# Patient Record
Sex: FEMALE | Race: BLACK OR AFRICAN AMERICAN | NOT HISPANIC OR LATINO | URBAN - METROPOLITAN AREA
[De-identification: names, ages, dates, MRNs, and addresses within clinical notes are randomized per-mention and may not be internally consistent; named-entity substitution may affect disease eponyms.]

---

## 2023-10-10 ENCOUNTER — HOSPITAL ENCOUNTER (EMERGENCY)
Facility: HOSPITAL | Age: 33
Discharge: HOME | End: 2023-10-10
Attending: EMERGENCY MEDICINE
Payer: COMMERCIAL

## 2023-10-10 ENCOUNTER — APPOINTMENT (OUTPATIENT)
Dept: RADIOLOGY | Facility: HOSPITAL | Age: 33
End: 2023-10-10
Payer: COMMERCIAL

## 2023-10-10 VITALS
BODY MASS INDEX: 29.47 KG/M2 | TEMPERATURE: 97.9 F | HEART RATE: 81 BPM | HEIGHT: 61 IN | WEIGHT: 156.09 LBS | OXYGEN SATURATION: 100 % | SYSTOLIC BLOOD PRESSURE: 132 MMHG | DIASTOLIC BLOOD PRESSURE: 71 MMHG | RESPIRATION RATE: 18 BRPM

## 2023-10-10 DIAGNOSIS — N20.0 KIDNEY STONE: Primary | ICD-10-CM

## 2023-10-10 LAB
ALBUMIN SERPL BCP-MCNC: 4.2 G/DL (ref 3.4–5)
ALP SERPL-CCNC: 61 U/L (ref 33–110)
ALT SERPL W P-5'-P-CCNC: 11 U/L (ref 7–45)
ANION GAP SERPL CALC-SCNC: 14 MMOL/L (ref 10–20)
APPEARANCE UR: ABNORMAL
AST SERPL W P-5'-P-CCNC: 17 U/L (ref 9–39)
BACTERIA #/AREA URNS AUTO: ABNORMAL /HPF
BASOPHILS # BLD AUTO: 0.03 X10*3/UL (ref 0–0.1)
BASOPHILS NFR BLD AUTO: 0.2 %
BILIRUB SERPL-MCNC: 0.4 MG/DL (ref 0–1.2)
BILIRUB UR STRIP.AUTO-MCNC: NEGATIVE MG/DL
BUN SERPL-MCNC: 13 MG/DL (ref 6–23)
CALCIUM SERPL-MCNC: 9.7 MG/DL (ref 8.6–10.6)
CHLORIDE SERPL-SCNC: 105 MMOL/L (ref 98–107)
CO2 SERPL-SCNC: 24 MMOL/L (ref 21–32)
COLOR UR: YELLOW
CREAT SERPL-MCNC: 0.62 MG/DL (ref 0.5–1.05)
EOSINOPHIL # BLD AUTO: 0.22 X10*3/UL (ref 0–0.7)
EOSINOPHIL NFR BLD AUTO: 1.5 %
ERYTHROCYTE [DISTWIDTH] IN BLOOD BY AUTOMATED COUNT: 11.9 % (ref 11.5–14.5)
GFR SERPL CREATININE-BSD FRML MDRD: >90 ML/MIN/1.73M*2
GLUCOSE SERPL-MCNC: 115 MG/DL (ref 74–99)
GLUCOSE UR STRIP.AUTO-MCNC: NEGATIVE MG/DL
HCT VFR BLD AUTO: 38.4 % (ref 36–46)
HGB BLD-MCNC: 12.7 G/DL (ref 12–16)
IMM GRANULOCYTES # BLD AUTO: 0.04 X10*3/UL (ref 0–0.7)
IMM GRANULOCYTES NFR BLD AUTO: 0.3 % (ref 0–0.9)
KETONES UR STRIP.AUTO-MCNC: NEGATIVE MG/DL
LEUKOCYTE ESTERASE UR QL STRIP.AUTO: ABNORMAL
LIPASE SERPL-CCNC: 20 U/L (ref 9–82)
LYMPHOCYTES # BLD AUTO: 1.55 X10*3/UL (ref 1.2–4.8)
LYMPHOCYTES NFR BLD AUTO: 10.6 %
MCH RBC QN AUTO: 30.5 PG (ref 26–34)
MCHC RBC AUTO-ENTMCNC: 33.1 G/DL (ref 32–36)
MCV RBC AUTO: 92 FL (ref 80–100)
MONOCYTES # BLD AUTO: 0.89 X10*3/UL (ref 0.1–1)
MONOCYTES NFR BLD AUTO: 6.1 %
MUCOUS THREADS #/AREA URNS AUTO: ABNORMAL /LPF
NEUTROPHILS # BLD AUTO: 11.83 X10*3/UL (ref 1.2–7.7)
NEUTROPHILS NFR BLD AUTO: 81.3 %
NITRITE UR QL STRIP.AUTO: NEGATIVE
NRBC BLD-RTO: 0 /100 WBCS (ref 0–0)
PH UR STRIP.AUTO: 7 [PH]
PLATELET # BLD AUTO: 142 X10*3/UL (ref 150–450)
PMV BLD AUTO: 13.1 FL (ref 7.5–11.5)
POTASSIUM SERPL-SCNC: 3.7 MMOL/L (ref 3.5–5.3)
PREGNANCY TEST URINE, POC: NEGATIVE
PROT SERPL-MCNC: 8.1 G/DL (ref 6.4–8.2)
PROT UR STRIP.AUTO-MCNC: ABNORMAL MG/DL
RBC # BLD AUTO: 4.16 X10*6/UL (ref 4–5.2)
RBC # UR STRIP.AUTO: ABNORMAL /UL
RBC #/AREA URNS AUTO: >20 /HPF
SODIUM SERPL-SCNC: 139 MMOL/L (ref 136–145)
SP GR UR STRIP.AUTO: 1.01
SQUAMOUS #/AREA URNS AUTO: ABNORMAL /HPF
UROBILINOGEN UR STRIP.AUTO-MCNC: <2 MG/DL
WBC # BLD AUTO: 14.6 X10*3/UL (ref 4.4–11.3)
WBC #/AREA URNS AUTO: >50 /HPF

## 2023-10-10 PROCEDURE — 96361 HYDRATE IV INFUSION ADD-ON: CPT

## 2023-10-10 PROCEDURE — 85025 COMPLETE CBC W/AUTO DIFF WBC: CPT

## 2023-10-10 PROCEDURE — 99284 EMERGENCY DEPT VISIT MOD MDM: CPT | Mod: 25

## 2023-10-10 PROCEDURE — 87086 URINE CULTURE/COLONY COUNT: CPT

## 2023-10-10 PROCEDURE — 96375 TX/PRO/DX INJ NEW DRUG ADDON: CPT

## 2023-10-10 PROCEDURE — 83690 ASSAY OF LIPASE: CPT

## 2023-10-10 PROCEDURE — 74176 CT ABD & PELVIS W/O CONTRAST: CPT | Mod: FOREIGN READ | Performed by: RADIOLOGY

## 2023-10-10 PROCEDURE — 99282 EMERGENCY DEPT VISIT SF MDM: CPT

## 2023-10-10 PROCEDURE — G1004 CDSM NDSC: HCPCS

## 2023-10-10 PROCEDURE — 2580000001 HC RX 258 IV SOLUTIONS

## 2023-10-10 PROCEDURE — 99285 EMERGENCY DEPT VISIT HI MDM: CPT | Performed by: EMERGENCY MEDICINE

## 2023-10-10 PROCEDURE — 96374 THER/PROPH/DIAG INJ IV PUSH: CPT

## 2023-10-10 PROCEDURE — 80053 COMPREHEN METABOLIC PANEL: CPT

## 2023-10-10 PROCEDURE — 81001 URINALYSIS AUTO W/SCOPE: CPT

## 2023-10-10 PROCEDURE — 36415 COLL VENOUS BLD VENIPUNCTURE: CPT

## 2023-10-10 PROCEDURE — 2500000004 HC RX 250 GENERAL PHARMACY W/ HCPCS (ALT 636 FOR OP/ED)

## 2023-10-10 RX ORDER — FLUCONAZOLE 150 MG/1
150 TABLET ORAL
Qty: 2 TABLET | Refills: 0 | Status: SHIPPED | OUTPATIENT
Start: 2023-10-10

## 2023-10-10 RX ORDER — CEPHALEXIN 500 MG/1
500 CAPSULE ORAL 4 TIMES DAILY
Qty: 28 CAPSULE | Refills: 0 | Status: SHIPPED | OUTPATIENT
Start: 2023-10-10 | End: 2023-10-17

## 2023-10-10 RX ORDER — ONDANSETRON 4 MG/1
4 TABLET, ORALLY DISINTEGRATING ORAL EVERY 8 HOURS PRN
Qty: 21 TABLET | Refills: 0 | Status: SHIPPED | OUTPATIENT
Start: 2023-10-10

## 2023-10-10 RX ORDER — MORPHINE SULFATE 4 MG/ML
2 INJECTION INTRAVENOUS ONCE
Status: COMPLETED | OUTPATIENT
Start: 2023-10-10 | End: 2023-10-10

## 2023-10-10 RX ORDER — OXYCODONE AND ACETAMINOPHEN 5; 325 MG/1; MG/1
1 TABLET ORAL EVERY 6 HOURS PRN
Qty: 12 TABLET | Refills: 0 | Status: SHIPPED | OUTPATIENT
Start: 2023-10-10 | End: 2023-10-13

## 2023-10-10 RX ORDER — KETOROLAC TROMETHAMINE 10 MG/1
10 TABLET, FILM COATED ORAL EVERY 6 HOURS PRN
Qty: 20 TABLET | Refills: 0 | Status: SHIPPED | OUTPATIENT
Start: 2023-10-10 | End: 2023-10-15

## 2023-10-10 RX ORDER — KETOROLAC TROMETHAMINE 15 MG/ML
15 INJECTION, SOLUTION INTRAMUSCULAR; INTRAVENOUS ONCE
Status: COMPLETED | OUTPATIENT
Start: 2023-10-10 | End: 2023-10-10

## 2023-10-10 RX ORDER — TAMSULOSIN HYDROCHLORIDE 0.4 MG/1
0.4 CAPSULE ORAL
Qty: 7 CAPSULE | Refills: 0 | Status: SHIPPED | OUTPATIENT
Start: 2023-10-10 | End: 2023-10-17

## 2023-10-10 RX ADMIN — KETOROLAC TROMETHAMINE 15 MG: 15 INJECTION, SOLUTION INTRAMUSCULAR; INTRAVENOUS at 03:55

## 2023-10-10 RX ADMIN — MORPHINE SULFATE 2 MG: 4 INJECTION INTRAVENOUS at 04:32

## 2023-10-10 RX ADMIN — SODIUM CHLORIDE, POTASSIUM CHLORIDE, SODIUM LACTATE AND CALCIUM CHLORIDE 500 ML: 600; 310; 30; 20 INJECTION, SOLUTION INTRAVENOUS at 04:30

## 2023-10-10 ASSESSMENT — COLUMBIA-SUICIDE SEVERITY RATING SCALE - C-SSRS
1. IN THE PAST MONTH, HAVE YOU WISHED YOU WERE DEAD OR WISHED YOU COULD GO TO SLEEP AND NOT WAKE UP?: NO
6. HAVE YOU EVER DONE ANYTHING, STARTED TO DO ANYTHING, OR PREPARED TO DO ANYTHING TO END YOUR LIFE?: NO
2. HAVE YOU ACTUALLY HAD ANY THOUGHTS OF KILLING YOURSELF?: NO
6. HAVE YOU EVER DONE ANYTHING, STARTED TO DO ANYTHING, OR PREPARED TO DO ANYTHING TO END YOUR LIFE?: NO
2. HAVE YOU ACTUALLY HAD ANY THOUGHTS OF KILLING YOURSELF?: NO

## 2023-10-10 NOTE — ED PROVIDER NOTES
Emergency Medicine Transition of Care Note.    I received Coy Hernandez in signout from previous team.  Please see the previous ED provider note for all HPI, PE and MDM up to the time of signout at 0700. This is in addition to the primary record.    In brief Coy Hernandez is an 33 y.o. female presenting for flank pain at the time of signout we were awaiting: Urology recommendations.      ED Course as of 10/10/23 0808   Tue Oct 10, 2023   0556 Urology consulted given obstructive stone, signs of urinary infection. Patient notified of results and signed out pending urology evaluations and final recs [SA]      ED Course User Index  [SA] Sheri Delgado,          Diagnoses as of 10/10/23 0808   Kidney stone     Labs Reviewed   CBC WITH AUTO DIFFERENTIAL - Abnormal       Result Value    WBC 14.6 (*)     nRBC 0.0      RBC 4.16      Hemoglobin 12.7      Hematocrit 38.4      MCV 92      MCH 30.5      MCHC 33.1      RDW 11.9      Platelets 142 (*)     MPV 13.1 (*)     Neutrophils % 81.3      Immature Granulocytes %, Automated 0.3      Lymphocytes % 10.6      Monocytes % 6.1      Eosinophils % 1.5      Basophils % 0.2      Neutrophils Absolute 11.83 (*)     Immature Granulocytes Absolute, Automated 0.04      Lymphocytes Absolute 1.55      Monocytes Absolute 0.89      Eosinophils Absolute 0.22      Basophils Absolute 0.03     COMPREHENSIVE METABOLIC PANEL - Abnormal    Glucose 115 (*)     Sodium 139      Potassium 3.7      Chloride 105      Bicarbonate 24      Anion Gap 14      Urea Nitrogen 13      Creatinine 0.62      eGFR >90      Calcium 9.7      Albumin 4.2      Alkaline Phosphatase 61      Total Protein 8.1      AST 17      Bilirubin, Total 0.4      ALT 11     URINALYSIS WITH REFLEX MICROSCOPIC AND CULTURE - Abnormal    Color, Urine Yellow      Appearance, Urine Hazy (*)     Specific Gravity, Urine 1.013      pH, Urine 7.0      Protein, Urine 100 (2+) (*)     Glucose, Urine NEGATIVE      Blood, Urine MODERATE (2+)  (*)     Ketones, Urine NEGATIVE      Bilirubin, Urine NEGATIVE      Urobilinogen, Urine <2.0      Nitrite, Urine NEGATIVE      Leukocyte Esterase, Urine MODERATE (2+) (*)    URINALYSIS MICROSCOPIC ONLY - Abnormal    WBC, Urine >50 (*)     RBC, Urine >20 (*)     Squamous Epithelial Cells, Urine 1-9 (SPARSE)      Bacteria, Urine 1+ (*)     Mucus, Urine 1+     LIPASE - Normal    Lipase 20      Narrative:     Venipuncture immediately after or during the administration of Metamizole may lead to falsely low results. Testing should be performed immediately prior to Metamizole dosing.   URINE CULTURE   URINALYSIS WITH REFLEX MICROSCOPIC AND CULTURE    Narrative:     The following orders were created for panel order Urinalysis with Reflex Microscopic and Culture.  Procedure                               Abnormality         Status                     ---------                               -----------         ------                     Urinalysis with Reflex Mi...[[id number]]  Abnormal            Final result               Extra Urine Gray Tube[[id number]]                             In process                   Please view results for these tests on the individual orders.   EXTRA URINE GRAY TUBE   GREEN TOP   URINE GRAY TUBE   GRAY TOP   POCT PREGNANCY, URINE    Preg Test, Ur Negative        CT abdomen pelvis wo IV contrast   Final Result   Moderate left-sided hydronephrosis and proximal hydroureter secondary   to an obstructing 3 mm calculus in the proximal left ureter with   periureteral fat stranding along the length of the left ureter.   Mild urinary bladder wall thickening which may be due to   underdistention however correlation with urinalysis to exclude any   possibility of cystitis is recommended.   5 cm septated cyst with internal calcification in the left ovary.    Nonemergent pelvic ultrasound is recommended for further evaluation.   Gallbladder sludge.   Signed by Son Atkins         Medical Decision  Making  Patient was found to have a 3 mm obstructing left-sided kidney stone.  She did have a slight leukocytosis with her WBCs being 14.6.  Urinalysis was positive for leukocyte Estrace, white cells, red cells, and +1 bacteria.  Urology did not recommend any further intervention from their standpoint and did recommend outpatient follow-up.  Per their recommendations per urology recommendations patient was given prescriptions for urology did recommend starting patient on Keflex.  Patient was also given prescriptions for Flomax, Toradol, Zofran, and Percocet for pain at home.  [facility] report was checked which showed no recent opiate prescriptions.  Patient was advised to follow-up with urology as an outpatient.  She was given a work note and was discharged from the ED in stable condition.        Final diagnoses:   None           Procedure  Procedures    KATHRYN Castro PA-C  10/10/23 0812

## 2023-10-10 NOTE — ED NOTES
Pt labs drawn and sent results to follow ua obtained sent hcg poct negative Md made aware. Pt to Ct scan results to follow.     Jenifer Vernon RN  10/10/23 8229

## 2023-10-10 NOTE — CONSULTS
Reason For Consult  Kidney stone    History Of Present Illness  Coy Hernandez is a 33 y.o. female with no significant pmh who [place] [facility] [place] with c/o intermittent left sided flank pain for the last 3 weeks. The pain was reported to be occasionally sharp and radiates to the front of her abdomen. Also reported associated blood on toilet paper after wiping. Patient endorses nausea but denies vomiting, fever, chills dysuria, gross hematuria, other urinary symptoms. Denies personal or family history of stones. On arrival to the ED she was afebrile with stable vital signs. Labs were significant for leukocytosis to 14.6 and normal serum creatinine, with UA showing +blood/-nitrites/+LE. Urine culture pending. CT demonstrated obstructing 3 mm calculus of the proximal left ureter with mild left-sided hydronephrosis and periureteral fat stranding. Urology was consulted for left obstructing renal stone     Past Medical History  She has a past medical history of Complete or unspecified spontaneous  without complication, Other conditions influencing health status, Other conditions influencing health status, and Personal history of other infectious and parasitic diseases.    Surgical History  She has a past surgical history that includes  section, classic (2014).     Social History  +Marijuana    Family History  Denies fam hx of stones     Allergies  Patient has no known allergies.    Review of Systems    All other ROS negative expect those mentioned in HPI.      Physical Exam  General: Sleeping in chair NAD.   Eyes: EOMI  ENMT: no apparent injury, no lesions seen, MMM  Head/neck: NCAT  Cardiac: regular rate in chart  Pulm: normal respiratory effort  GI: soft, NT/ND, no masses palpated, mild left flank pain  : voiding spontaneously   Msk: NG  Extremities: normal extremities  Skin: warm, dry, no lesions noted  Neuro: AOx3  Psych: appropriate mood and behavior      Last Recorded  "Vitals  Blood pressure 132/71, pulse 81, temperature 36.6 °C (97.9 °F), temperature source Oral, resp. rate 18, height 1.549 m (5' 1\"), weight 70.8 kg (156 lb 1.4 oz), SpO2 100 %.    Labs:   Results from last 72 hours   Lab Units 10/10/23  0420   CREATININE mg/dL 0.62   HEMOGLOBIN g/dL 12.7   WBC AUTO x10*3/uL 14.6*   GLUCOSE mg/dL 115*   POTASSIUM mmol/L 3.7   CHLORIDE mmol/L 105   CALCIUM mg/dL 9.7       Imaging: Moderate left-sided hydronephrosis and proximal hydroureter secondary to an obstructing 3 mm calculus in the proximal left ureter with periureteral fat stranding along the length of the left ureter. Mild urinary bladder wall thickening which may be due to underdistention however correlation with urinalysis to exclude any possibility of cystitis is recommended. 5 cm septated cyst with internal calcification in the left ovary. Nonemergent pelvic ultrasound is recommended for further evaluation.    10/10: Afebrile. Pain is currently well controlled. +nausea/-vomiting. sCr wnl     Assessment:  3mm left ureteral stone. Urgent urologic stent placement is typically warranted in patients with UTI, acute kidney injury beyond that expected from unilateral obstruction, anuria, unyielding pain, nausea, or vomiting. Though patient has an obstructing stone with resulting hydronephrosis, it is likely to spontaneously pass based on size criteria, and creatinine is within normal limits. Patient has mild leukocytosis, but is afebrile with UA negative for nitrites and no urinary symptoms indicating UTI. She will be sent on abx to cover urinary pathogens with follow-up upon return of culture. Her pain has been well controlled on medication, and she denies nausea and vomiting.     Recommendations:    - No current need for surgical intervention  - Patient OK for DC from urology standpoint  - Encourage hydration and straining urine  - Please send patient with Flomax for MET, zofran for nausea, 5 days of keflex, Toradol for pain " control  - Follow-up urine culture and tailor abx appropriately upon discharge  - Educate patient on return precautions including intractable nausea/vomiting/pain, fever/chills, continued dysuria  - Patient to follow up in 4 weeks with urology   - Gyn follow-up for l ovarian cyst  - Urology to sign off, please page for questions/concerns    Seen and discussed with chief resident, Dr. Santi Oro PAXANDER  Pager [phone]

## 2023-10-10 NOTE — Clinical Note
Coy Hernandez was seen and treated in our emergency department on 10/10/2023.  She may return to work on 10/15/2023.       If you have any questions or concerns, please don't hesitate to call.      Ondina Arias PA-C

## 2023-10-10 NOTE — ED PROVIDER NOTES
HPI   Chief Complaint   Patient presents with    Back Pain       HPI     Patient is a 33-year-old female who presents with left-sided lower back pain.  She endorses sharp pain that intermittently radiates to the front of her abdomen.  Patient states this pain has been ongoing for the past weeks.  She initially thought the pain was because she was on her feet a lot because she is a .  She denies any systemic signs or symptoms such as fever, chills, abdominal pain, nausea, vomiting, chest pain, shortness of breath.  She denies history of kidney stones.  She states she is a social drinker and has never had pancreatitis.  She denies any pertinent medical history.  She did not take anything for pain prior to coming.  She denies any history of IV drug use, malignancy, immunosuppression, trauma. She denies any hematuria, dysuria, urinary frequency.                No data recorded                Patient History   Past Medical History:   Diagnosis Date    Complete or unspecified spontaneous  without complication         Other conditions influencing health status     History of pregnancy    Other conditions influencing health status     Fetal distress    Personal history of other infectious and parasitic diseases     History of gonorrhea     Past Surgical History:   Procedure Laterality Date     SECTION, CLASSIC  2014     Section     No family history on file.  Social History     Tobacco Use    Smoking status: Not on file    Smokeless tobacco: Not on file   Substance Use Topics    Alcohol use: Not on file    Drug use: Not on file       Physical Exam   ED Triage Vitals [10/10/23 0239]   Temp Heart Rate Resp BP   37.4 °C (99.3 °F) 91 20 (!) 147/109      SpO2 Temp src Heart Rate Source Patient Position   97 % -- -- --      BP Location FiO2 (%)     -- --       Physical Exam  Vitals and nursing note reviewed.   Constitutional:       General: She is not in acute distress.      Appearance: Normal appearance.   HENT:      Head: Normocephalic and atraumatic.   Eyes:      Conjunctiva/sclera: Conjunctivae normal.   Cardiovascular:      Rate and Rhythm: Normal rate and regular rhythm.   Pulmonary:      Effort: Pulmonary effort is normal. No respiratory distress.   Abdominal:      General: Abdomen is flat. There is no distension.      Palpations: Abdomen is soft.      Tenderness: There is no abdominal tenderness. There is left CVA tenderness. There is no right CVA tenderness.   Musculoskeletal:         General: Normal range of motion.      Cervical back: Normal range of motion and neck supple.   Skin:     General: Skin is warm and dry.   Neurological:      General: No focal deficit present.      Mental Status: She is alert and oriented to person, place, and time. Mental status is at baseline.   Psychiatric:         Mood and Affect: Mood normal.         Behavior: Behavior normal.         ED Course & MDM   ED Course as of 10/12/23 0631   Tue Oct 10, 2023   0556 Urology consulted given obstructive stone, signs of urinary infection. Patient notified of results and signed out pending urology evaluations and final recs [SA]      ED Course User Index  [SA] Sherisurya Ellisonwala, DO         Diagnoses as of 10/12/23 0631   Kidney stone       Medical Decision Making  Patient is a 33-year-old female presents with left-sided flank pain.  Patient does not appear septic or peritonitic, is hemodynamically stable.  Physical exam notable for left CVA tenderness.  Patient is tearful during examination secondary to pain.  Differential diagnoses considered include cystitis, pyelonephritis, nephrolithiasis, pancreatitis.  Patient given pain medications, fluids, CT without contrast obtained to further evaluate. No signs of trauma, midline spinal TTP on exam.  Labs with leukocytosis of 14.6 and neutrophil predominance, hemoglobin stable, kidney function within normal limits, no overt metabolic derangements.  CT with 3 mm  obstructive stone as below.      IMPRESSION:  Moderate left-sided hydronephrosis and proximal hydroureter secondary  to an obstructing 3 mm calculus in the proximal left ureter with  periureteral fat stranding along the length of the left ureter.  Mild urinary bladder wall thickening which may be due to  underdistention however correlation with urinalysis to exclude any  possibility of cystitis is recommended.  5 cm septated cyst with internal calcification in the left ovary.   Nonemergent pelvic ultrasound is recommended for further evaluation.  Gallbladder sludge.    Procedure  Procedures     Sheri Delgado DO  Resident  10/10/23 0557       Sheri Delgado DO  Resident  10/10/23 0642       Sheri Delgado DO  Resident  10/12/23 0631

## 2023-10-11 LAB — BACTERIA UR CULT: ABNORMAL

## 2023-10-12 ENCOUNTER — TELEPHONE (OUTPATIENT)
Dept: PHARMACY | Facility: HOSPITAL | Age: 33
End: 2023-10-12
Payer: COMMERCIAL

## 2023-10-12 NOTE — PROGRESS NOTES
EDPD Note: Antibiotics Reviewed and Warranted    Contacted Ms. Coy Hernandez regarding a positive Staphylococcus saprophyticus urine  culture/result that was taken during their recent emergency room visit. I completed education with patient . The patient is being treated appropriately with cephalexin 500mg four times daily for 7 days.     Susceptibility data from last 90 days.  Collected Specimen Info Organism   10/10/23 Urine from Clean Catch/Voided Staphylococcus saprophyticus        No further follow up needed from EDPD Team.     If there are any other questions for the ED Post-Discharge Follow Up Team, please contact [phone]. Fax: [phone].    Chanelle Neal, PharmD  PGY1 Community Resident  [facility]

## 2023-10-26 LAB — HOLD SPECIMEN: NORMAL

## 2023-10-29 LAB — HOLD SPECIMEN: NORMAL
